# Patient Record
Sex: MALE | Race: WHITE | URBAN - METROPOLITAN AREA
[De-identification: names, ages, dates, MRNs, and addresses within clinical notes are randomized per-mention and may not be internally consistent; named-entity substitution may affect disease eponyms.]

---

## 2023-05-12 ENCOUNTER — OFFICE VISIT (OUTPATIENT)
Dept: PEDIATRICS | Facility: CLINIC | Age: 23
End: 2023-05-12
Payer: COMMERCIAL

## 2023-05-12 VITALS — WEIGHT: 163.63 LBS | TEMPERATURE: 98 F | BODY MASS INDEX: 22.16 KG/M2 | HEIGHT: 72 IN

## 2023-05-12 DIAGNOSIS — Z71.84 TRAVEL ADVICE ENCOUNTER: Primary | ICD-10-CM

## 2023-05-12 PROCEDURE — 90472 HEPATITIS A VACCINE PEDIATRIC / ADOLESCENT 2 DOSE IM: ICD-10-PCS | Mod: S$GLB,,, | Performed by: PEDIATRICS

## 2023-05-12 PROCEDURE — 90633 HEPA VACC PED/ADOL 2 DOSE IM: CPT | Mod: S$GLB,,, | Performed by: PEDIATRICS

## 2023-05-12 PROCEDURE — 99999 PR PBB SHADOW E&M-NEW PATIENT-LVL III: CPT | Mod: PBBFAC,,, | Performed by: PEDIATRICS

## 2023-05-12 PROCEDURE — 90471 YELLOW FEVER VACCINE SQ: ICD-10-PCS | Mod: S$GLB,,, | Performed by: PEDIATRICS

## 2023-05-12 PROCEDURE — 3008F PR BODY MASS INDEX (BMI) DOCUMENTED: ICD-10-PCS | Mod: CPTII,S$GLB,, | Performed by: PEDIATRICS

## 2023-05-12 PROCEDURE — 1159F PR MEDICATION LIST DOCUMENTED IN MEDICAL RECORD: ICD-10-PCS | Mod: CPTII,S$GLB,, | Performed by: PEDIATRICS

## 2023-05-12 PROCEDURE — 1160F RVW MEDS BY RX/DR IN RCRD: CPT | Mod: CPTII,S$GLB,, | Performed by: PEDIATRICS

## 2023-05-12 PROCEDURE — 90717 YELLOW FEVER VACCINE SQ: ICD-10-PCS | Mod: S$GLB,,, | Performed by: PEDIATRICS

## 2023-05-12 PROCEDURE — 99214 PR OFFICE/OUTPT VISIT, EST, LEVL IV, 30-39 MIN: ICD-10-PCS | Mod: 25,S$GLB,, | Performed by: PEDIATRICS

## 2023-05-12 PROCEDURE — 3008F BODY MASS INDEX DOCD: CPT | Mod: CPTII,S$GLB,, | Performed by: PEDIATRICS

## 2023-05-12 PROCEDURE — 99214 OFFICE O/P EST MOD 30 MIN: CPT | Mod: 25,S$GLB,, | Performed by: PEDIATRICS

## 2023-05-12 PROCEDURE — 90717 YELLOW FEVER VACCINE SUBQ: CPT | Mod: S$GLB,,, | Performed by: PEDIATRICS

## 2023-05-12 PROCEDURE — 1159F MED LIST DOCD IN RCRD: CPT | Mod: CPTII,S$GLB,, | Performed by: PEDIATRICS

## 2023-05-12 PROCEDURE — 90472 IMMUNIZATION ADMIN EACH ADD: CPT | Mod: S$GLB,,, | Performed by: PEDIATRICS

## 2023-05-12 PROCEDURE — 1160F PR REVIEW ALL MEDS BY PRESCRIBER/CLIN PHARMACIST DOCUMENTED: ICD-10-PCS | Mod: CPTII,S$GLB,, | Performed by: PEDIATRICS

## 2023-05-12 PROCEDURE — 90633 HEPATITIS A VACCINE PEDIATRIC / ADOLESCENT 2 DOSE IM: ICD-10-PCS | Mod: S$GLB,,, | Performed by: PEDIATRICS

## 2023-05-12 PROCEDURE — 99999 PR PBB SHADOW E&M-NEW PATIENT-LVL III: ICD-10-PCS | Mod: PBBFAC,,, | Performed by: PEDIATRICS

## 2023-05-12 PROCEDURE — 90471 IMMUNIZATION ADMIN: CPT | Mod: S$GLB,,, | Performed by: PEDIATRICS

## 2023-05-12 RX ORDER — ATOVAQUONE AND PROGUANIL HYDROCHLORIDE 250; 100 MG/1; MG/1
1 TABLET, FILM COATED ORAL DAILY
Qty: 60 TABLET | Refills: 0 | Status: SHIPPED | OUTPATIENT
Start: 2023-05-12 | End: 2023-05-18 | Stop reason: SDUPTHER

## 2023-05-12 RX ORDER — MUPIROCIN 20 MG/G
OINTMENT TOPICAL 3 TIMES DAILY
Qty: 22 G | Refills: 0 | Status: SHIPPED | OUTPATIENT
Start: 2023-05-12 | End: 2023-05-19

## 2023-05-12 RX ORDER — ONDANSETRON 4 MG/1
4 TABLET, ORALLY DISINTEGRATING ORAL EVERY 8 HOURS PRN
Qty: 4 TABLET | Refills: 0 | Status: SHIPPED | OUTPATIENT
Start: 2023-05-12

## 2023-05-12 NOTE — PATIENT INSTRUCTIONS
Dr[facility] Leary, [facility]  [phone]        Travel Advice    General Travel Tips   Learn about your destination- rural vs. urban, accommodations, food and water preparation, geography, i. e. elevation, jungle, etc., season of year, medical services available.     Schedule an office visit for a travel medical consultation.  Try to schedule this at least 6 weeks ahead of travel dates (not always possible).     3.  Obtain your current immunization record prior to the office visit.  If you have received your yellow fever vaccine, find your International Certificate of Vaccination     Dental, eye exam recommended, depending on length of travel.   - extra pair of glasses or contacts recommended.     Obtain appropriate amount of your prescription medicines for your travel duration.  Wear a medic alert bracelet for chronic conditions, i. e. diabetes, seizures and specific allergies.      Make sure you are covered by your health insurance for medical issues during your travel.  Many health insurance plans will not cover international travel.  One such provider is roundtrip trip insurance.  < [email or web address]   This is not an endorsement of this insurance provider.     Obtain a list of medical providers at your travel destinations (English speaking).     First aid travel kit:     Analgesics, i. e. Acetaminophen (Tylenol) or Ibuprofen (Motrin, Advil)   Antibacterial wipes and Hand , i. e. Purell   Antibiotic ointment, i. e. Polysporin, Triple Antibiotic   Anti- diarrhea caplets, i. e. Imodium, Pepto Bismol   Antihistamine, i. e. Benadryl   Antihistamine (less sedating), i. e. Zyrtec or Claritin   Band-Aids and bandages, i. e. Ace wrap    Decongestant, i. e. Sudafed   Eye drops, i. e. Clear eyes, Visine   Gauze pads   Gentle laxative. i. e. Senekot or MOM    Hydrocortisone cream/ointment   Insect repellent   Lip protectants, i.  Yulissa Pina   Medical tape   Motion sickness tablets, i. e. dramamine   Tums       Prescription medicines to consider:   Bactroban ointment- topical antibacterial for superficial infections   Zofran- dissolving tablet for nausea and vomiting        9.  Obtain or find your passport, visa or other necessary identification papers.  Make sure they are up to date.  Bring a photocopy of your passport to carry with you, also.    10.  Malaria status in your destination:    If Chloroquine- weekly, starting 1 week prior and finishing 4 weeks after returning  If Malarone- give daily, starting 2 days before travel and finishing 7 days after returning (not during pregnancy, nursing or <11 pound size)    11.  Yellow fever vaccine requirement for your destination   [place] (mid, western[place] and [place] (northern)      12.  Helpful links are below:    [place] Government Tips:  <http://travel.state.gov/travel/tips/tips_1232.html>    MD Travel Health:  <http://www.mdPublic Solution.com/>    Center for Disease Control:  <http://wwwnc.cdc.gov/travel/>    WebMD: <http://emedicine.medscape.com/article/743322-aeusuuew>

## 2023-05-12 NOTE — PROGRESS NOTES
"SUBJECTIVE:  Efrem Navarro is a 22 y.o. male here alone, who is a historian.    HPI  Efrem presents to clinic to get the yellow fever vaccine, the Hep A vaccine, and a Maralone prescription. He will be travelling to [facility] and [facility] from 5/23/23 to 7/10/23.   No previous vaccine issues.    Efrem's allergies, medications, history, and problem list were updated as appropriate.    Review of Systems  A comprehensive review of symptoms was completed and negative except as noted in the HPI.    OBJECTIVE:  Vital signs  Vitals:    05/12/23 1530   Temp: 98.3 °F (36.8 °C)   TempSrc: Oral   Weight: 74.2 kg (163 lb 9.6 oz)   Height: 5' 11.5" (1.816 m)        Physical Exam  Constitutional:       General: He is not in acute distress.     Appearance: Normal appearance. He is normal weight. He is not ill-appearing or toxic-appearing.   HENT:      Head: Normocephalic.      Right Ear: Tympanic membrane, ear canal and external ear normal.      Left Ear: Tympanic membrane, ear canal and external ear normal.      Nose: Nose normal.      Mouth/Throat:      Mouth: Mucous membranes are moist.      Pharynx: Oropharynx is clear.   Eyes:      Extraocular Movements: Extraocular movements intact.      Conjunctiva/sclera: Conjunctivae normal.      Pupils: Pupils are equal, round, and reactive to light.   Cardiovascular:      Rate and Rhythm: Normal rate and regular rhythm.      Pulses: Normal pulses.      Heart sounds: Normal heart sounds. No murmur heard.  Pulmonary:      Effort: Pulmonary effort is normal.      Breath sounds: Normal breath sounds.   Abdominal:      General: Abdomen is flat. Bowel sounds are normal.      Palpations: Abdomen is soft.   Musculoskeletal:         General: Normal range of motion.      Cervical back: Normal range of motion and neck supple. No tenderness.   Lymphadenopathy:      Cervical: No cervical adenopathy.   Skin:     General: Skin is warm.   Neurological:      General: No focal deficit present.      Mental " Status: He is alert and oriented to person, place, and time.      Gait: Tandem walk normal.   Psychiatric:         Mood and Affect: Mood normal.         Behavior: Behavior normal.         Thought Content: Thought content normal.         ASSESSMENT/PLAN:  Efrem was seen today for immunizations.    Diagnoses and all orders for this visit:    Travel advice encounter    Other orders  -     mupirocin (BACTROBAN) 2 % ointment; Apply topically 3 (three) times daily. for 7 days  -     ondansetron (ZOFRAN-ODT) 4 MG TbDL; Take 1 tablet (4 mg total) by mouth every 8 (eight) hours as needed (Nausea and/or vomiting).  -     (In Office Administered) Yellow Fever Vaccine (SQ)  -     (In Office Administered) Hepatitis A Vaccine (Pediatric/Adolescent) (2 Dose) (IM)  -     atovaquone-proguaniL (MALARONE) 250-100 mg Tab; Take 1 tablet by mouth once daily.         No visits with results within 1 Day(s) from this visit.   Latest known visit with results is:   No results found for any previous visit.       Follow Up:  No follow-ups on file.

## 2023-05-18 ENCOUNTER — TELEPHONE (OUTPATIENT)
Dept: PEDIATRICS | Facility: CLINIC | Age: 23
End: 2023-05-18
Payer: COMMERCIAL

## 2023-05-18 RX ORDER — ATOVAQUONE AND PROGUANIL HYDROCHLORIDE 250; 100 MG/1; MG/1
1 TABLET, FILM COATED ORAL DAILY
Qty: 60 TABLET | Refills: 0 | Status: SHIPPED | OUTPATIENT
Start: 2023-05-18 | End: 2023-07-17

## 2023-05-18 NOTE — TELEPHONE ENCOUNTER
Mom notified to call around to find who has it in stock and let us know where to send the rx. Mom v/u. Pharmacy notified.     ----- Message from Kayla Baeza sent at 5/18/2023  9:54 AM CDT -----  Regarding: [facility]  Contact: [facility]  Malarone was sent on May 12th, but they cannot get in stock. Pharmacy was calling to see what the physician wanted to do  Phone: [phone]